# Patient Record
Sex: MALE | Race: WHITE | NOT HISPANIC OR LATINO | Employment: UNEMPLOYED | URBAN - METROPOLITAN AREA
[De-identification: names, ages, dates, MRNs, and addresses within clinical notes are randomized per-mention and may not be internally consistent; named-entity substitution may affect disease eponyms.]

---
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
## 2022-03-13 DIAGNOSIS — L70.9 ACNE, UNSPECIFIED ACNE TYPE: ICD-10-CM

## 2022-03-13 RX ORDER — METHOCARBAMOL 750 MG/1
TABLET ORAL
Qty: 90 G | Refills: 2 | Status: SHIPPED | OUTPATIENT
Start: 2022-03-13

## 2022-08-30 ENCOUNTER — OFFICE VISIT (OUTPATIENT)
Dept: FAMILY MEDICINE CLINIC | Facility: CLINIC | Age: 22
End: 2022-08-30
Payer: COMMERCIAL

## 2022-08-30 VITALS
RESPIRATION RATE: 16 BRPM | SYSTOLIC BLOOD PRESSURE: 120 MMHG | OXYGEN SATURATION: 97 % | HEART RATE: 63 BPM | TEMPERATURE: 98.4 F | BODY MASS INDEX: 21.62 KG/M2 | HEIGHT: 70 IN | WEIGHT: 151 LBS | DIASTOLIC BLOOD PRESSURE: 69 MMHG

## 2022-08-30 DIAGNOSIS — R17 ELEVATED BILIRUBIN: ICD-10-CM

## 2022-08-30 DIAGNOSIS — Z00.00 ENCOUNTER FOR WELLNESS EXAMINATION IN ADULT: Primary | ICD-10-CM

## 2022-08-30 DIAGNOSIS — L70.9 ACNE, UNSPECIFIED ACNE TYPE: ICD-10-CM

## 2022-08-30 PROBLEM — R10.13 EPIGASTRIC PAIN: Status: RESOLVED | Noted: 2021-10-10 | Resolved: 2022-08-30

## 2022-08-30 PROCEDURE — 99395 PREV VISIT EST AGE 18-39: CPT | Performed by: FAMILY MEDICINE

## 2022-08-30 RX ORDER — TRETINOIN 0.25 MG/G
GEL TOPICAL
Qty: 45 G | Refills: 3 | Status: SHIPPED | OUTPATIENT
Start: 2022-08-30

## 2022-08-30 NOTE — PROGRESS NOTES
FAMILY PRACTICE OFFICE VISIT       NAME: Jean Hope  AGE: 23 y o  SEX: male       : 2000        MRN: [id number]        Assessment and Plan     1  Encounter for wellness examination in adult    2  Acne, unspecified acne type  -     tretinoin (RETIN-A) 0 025 % gel; Apply topically daily at bedtime    3  Elevated bilirubin  Assessment & Plan:  Blood work performed a year ago revealed elevated bilirubin  Patient originally presented for evaluation of abdominal pain that subsequently has completely resolved  Normal CBC  Elevated bilirubin in the setting of normal LFTs could represent Gilbert's syndrome  He offers no complaints present time and prefers to hold of right upper quadrant ultrasound that I ordered back in 2021  Patient would like to repeat blood work  If bilirubin is still elevated he would be agreeable to pursue further workup with right upper quadrant ultrasound  Orders:  -     CBC and differential; Future  -     Comprehensive metabolic panel; Future  -     TSH, 3rd generation; Future            Depression Screening and Follow-up Plan: Patient was screened for depression during today's encounter  They screened negative with a PHQ-2 score of 0  There are no Patient Instructions on file for this visit  Return in about 1 year (around 2023) for Annual physical/well exam     Discussed with the patient and all questioned fully answered  He will call me if any problems arise  M*Modal software was used to dictate this note  It may contain errors with dictating incorrect words/spelling  Please contact provider directly with any questions  Chief Complaint     Chief Complaint   Patient presents with    Physical Exam       History of Present Illness       Well exam   Patient is here today accompanied by his mother and younger brother      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[employer], Biology Major   No daily Rx     No health  concerns   Regular diet , sleeps well, no abdominal pain or dyspepsia  Regular diet  Regular physical activity, works out at [employer]  Patient has been using Adapalene gel  OTC for acne - work somewhat , he is requesting stronger her Retin-A preparation for his acne  Patient had evaluation due to complain of abdominal pain a year ago  Blood work revealed elevated bilirubin of 3 1 with normal ALT, AST and alkaline phosphatase  Normal hemoglobin   Patient has not proceed with right upper quadrant ultrasound that I have recommended  He is completely asymptomatic from GI standpoint  Review of Systems   Review of Systems   Constitutional: Negative  HENT: Negative  Eyes: Negative  Respiratory: Negative  Cardiovascular: Negative  Gastrointestinal: Negative  Negative for abdominal pain and constipation  Endocrine: Negative  Genitourinary: Negative  Musculoskeletal: Negative  Skin: Negative for color change  acne   Allergic/Immunologic: Negative  Neurological: Negative  Hematological: Negative  Psychiatric/Behavioral: Negative  Active Problem List     Patient Active Problem List   Diagnosis    Elevated bilirubin       Past Medical History:  History reviewed  No pertinent past medical history  Past Surgical History:  History reviewed  No pertinent surgical history      Family History:  Family History   Problem Relation Age of Onset    Thyroid disease Mother     Hyperlipidemia Mother     Coronary artery disease Father     Hyperlipidemia Father     Anxiety disorder Father     AISHA disease Father        Social History:  Social History     Socioeconomic History    Marital status: Single     Spouse name: Not on file    Number of children: Not on file    Years of education: Not on file    Highest education level: Not on file   Occupational History    Not on file   Tobacco Use    Smoking status: Never Smoker    Smokeless tobacco: Never Used   Vaping Use    Vaping Use: Never used   Substance and Sexual Activity    Alcohol use: Not Currently    Drug use: Not Currently    Sexual activity: Not Currently   Other Topics Concern    Not on file   Social History Narrative    Not on file     Social Determinants of Health     Financial Resource Strain: Not on file   Food Insecurity: Not on file   Transportation Needs: Not on file   Physical Activity: Not on file   Stress: Not on file   Social Connections: Not on file   Intimate Partner Violence: Not on file   Housing Stability: Not on file         Objective     Vitals:    08/30/22 1635   BP: 120/69   BP Location: Right arm   Patient Position: Sitting   Cuff Size: Large   Pulse: 63   Resp: 16   Temp: 98 4 °F (36 9 °C)   TempSrc: Temporal   SpO2: 97%   Weight: 68 5 kg (151 lb)   Height: 5' 10" (1 778 m)       Wt Readings from Last 3 Encounters:   08/30/22 68 5 kg (151 lb)   10/04/21 67 3 kg (148 lb 6 4 oz) (39 %, Z= -0 27)*   03/10/20 64 5 kg (142 lb 3 2 oz) (38 %, Z= -0 29)*     * Growth percentiles are based on CDC (Boys, 2-20 Years) data  Physical Exam  Vitals and nursing note reviewed  Constitutional:       General: He is not in acute distress  Appearance: Normal appearance  He is well-developed  He is not ill-appearing  HENT:      Head: Normocephalic and atraumatic  Right Ear: Tympanic membrane and ear canal normal       Left Ear: Tympanic membrane and ear canal normal       Nose: Nose normal       Mouth/Throat:      Mouth: Mucous membranes are moist    Eyes:      General: No scleral icterus  Conjunctiva/sclera: Conjunctivae normal    Neck:      Vascular: No carotid bruit  Cardiovascular:      Rate and Rhythm: Normal rate and regular rhythm  Heart sounds: Normal heart sounds  No murmur heard  Pulmonary:      Effort: Pulmonary effort is normal  No respiratory distress  Breath sounds: Normal breath sounds  No wheezing or rales  Abdominal:      General: Bowel sounds are normal  There is no distension or abdominal bruit  Palpations: Abdomen is soft  There is no mass  Tenderness: There is no abdominal tenderness  Hernia: No hernia is present  Musculoskeletal:         General: Normal range of motion  Cervical back: Normal range of motion and neck supple  No rigidity  Right lower leg: No edema  Left lower leg: No edema  Skin:     General: Skin is warm  Findings: No rash  Comments: Acne face   Neurological:      General: No focal deficit present  Mental Status: He is alert and oriented to person, place, and time  Cranial Nerves: No cranial nerve deficit  Psychiatric:         Mood and Affect: Mood normal          Behavior: Behavior normal          Thought Content: Thought content normal           Pertinent Laboratory/Diagnostic Studies:    No results found for: WBC, HGB, HCT, MCV, PLT    No results found for: TSH    No results found for: CHOL  No results found for: TRIG  No results found for: HDL  No results found for: LDLCALC  No results found for: HGBA1C  No results found for: SODIUM, K, CL, CO2, ANIONGAP, AGAP, BUN, CREATININE, GLUC, GLUF, CALCIUM, AST, ALT, ALKPHOS, PROT, TP, BILITOT, TBILI, EGFR    Orders Placed This Encounter   Procedures    CBC and differential    Comprehensive metabolic panel    TSH, 3rd generation       ALLERGIES:  No Known Allergies    Current Medications     Current Outpatient Medications   Medication Sig Dispense Refill    Protein POWD Take 1 Scoop by mouth in the morning POST WORK OUT      tretinoin (RETIN-A) 0 025 % gel Apply topically daily at bedtime 45 g 3    Acne Medication 5 5 % gel APPLY TO AFFECTED AREA EVERY DAY (Patient not taking: Reported on 8/30/2022) 90 g 2    clindamycin (CLINDAGEL) 1 % gel APPLY TO AFFECTED AREA TWICE A DAY (Patient not taking: Reported on 8/30/2022) 60 g 1     No current facility-administered medications for this visit         Medications Discontinued During This Encounter   Medication Reason    pantoprazole (PROTONIX) 40 mg tablet Therapy completed       Health Maintenance     Health Maintenance   Topic Date Due    Hepatitis C Screening  Never done    HIV Screening  Never done    Influenza Vaccine (1) 09/01/2022    COVID-19 Vaccine (1) 11/30/2022 (Originally 2002)    Depression Screening  08/30/2023    BMI: Adult  08/30/2023    Annual Physical  08/30/2023    DTaP,Tdap,and Td Vaccines (7 - Td or Tdap) 09/18/2023    Pneumococcal Vaccine: Pediatrics (0 to 5 Years) and At-Risk Patients (6 to 59 Years)  Completed    HIB Vaccine  Completed    Hepatitis B Vaccine  Completed    IPV Vaccine  Completed    Hepatitis A Vaccine  Completed    Meningococcal ACWY Vaccine  Completed    HPV Vaccine  Completed       Immunization History   Administered Date(s) Administered    DTaP 2002, 2002, 2002, 05/12/2003, 10/01/2007    HPV 11/18/2013    HPV Quadrivalent 09/18/2013, 11/18/2013, 02/21/2014, 10/20/2017    Hep A, ped/adol, 2 dose 10/01/2007, 10/31/2008    Hep B, Adolescent or Pediatric 2002, 2002, 05/12/2003    Hepatitis A 10/01/2007, 10/31/2008    HiB 2002, 2002, 05/12/2003    INFLUENZA 10/31/2008    IPV 2002, 2002, 05/12/2003, 10/01/2007    MMR 02/10/2003, 10/01/2007    MMRV 10/01/2007    Meningococcal B, Recombinant (TRUMENBA) 03/10/2020    Meningococcal Conjugate (MCV4O) 09/18/2013    Meningococcal MCV4P 09/18/2013, 10/11/2018, 09/10/2020    Pneumococcal Conjugate 13-Valent 2002, 2002, 05/12/2003    Pneumococcal Conjugate PCV 7 2002, 2002, 05/12/2003    Tdap 09/18/2013    Tuberculin Skin Test-PPD Intradermal 10/01/2007    Varicella 02/10/2003, 10/01/2007       Lupe Andres MD

## 2022-09-04 NOTE — ASSESSMENT & PLAN NOTE
Blood work performed a year ago revealed elevated bilirubin  Patient originally presented for evaluation of abdominal pain that subsequently has completely resolved  Normal CBC  Elevated bilirubin in the setting of normal LFTs could represent Gilbert's syndrome  He offers no complaints present time and prefers to hold of right upper quadrant ultrasound that I ordered back in October of 2021  Patient would like to repeat blood work  If bilirubin is still elevated he would be agreeable to pursue further workup with right upper quadrant ultrasound

## 2022-09-08 ENCOUNTER — APPOINTMENT (OUTPATIENT)
Dept: LAB | Facility: CLINIC | Age: 22
End: 2022-09-08
Payer: COMMERCIAL

## 2022-09-08 DIAGNOSIS — R10.13 EPIGASTRIC PAIN: ICD-10-CM

## 2022-09-08 DIAGNOSIS — R17 ELEVATED BILIRUBIN: ICD-10-CM

## 2022-09-08 DIAGNOSIS — R71.8 ELEVATED HEMATOCRIT: Primary | ICD-10-CM

## 2022-09-08 LAB
ALBUMIN SERPL BCP-MCNC: 4.2 G/DL (ref 3.5–5)
ALP SERPL-CCNC: 86 U/L (ref 46–116)
ALT SERPL W P-5'-P-CCNC: 31 U/L (ref 12–78)
ANION GAP SERPL CALCULATED.3IONS-SCNC: 7 MMOL/L (ref 4–13)
AST SERPL W P-5'-P-CCNC: 22 U/L (ref 5–45)
BASOPHILS # BLD AUTO: 0.02 THOUSANDS/ΜL (ref 0–0.1)
BASOPHILS NFR BLD AUTO: 0 % (ref 0–1)
BILIRUB DIRECT SERPL-MCNC: 0.33 MG/DL (ref 0–0.2)
BILIRUB SERPL-MCNC: 2.17 MG/DL (ref 0.2–1)
BUN SERPL-MCNC: 10 MG/DL (ref 5–25)
CALCIUM SERPL-MCNC: 9.4 MG/DL (ref 8.3–10.1)
CHLORIDE SERPL-SCNC: 104 MMOL/L (ref 96–108)
CO2 SERPL-SCNC: 28 MMOL/L (ref 21–32)
CREAT SERPL-MCNC: 0.96 MG/DL (ref 0.6–1.3)
EOSINOPHIL # BLD AUTO: 0.05 THOUSAND/ΜL (ref 0–0.61)
EOSINOPHIL NFR BLD AUTO: 1 % (ref 0–6)
ERYTHROCYTE [DISTWIDTH] IN BLOOD BY AUTOMATED COUNT: 12.1 % (ref 11.6–15.1)
GFR SERPL CREATININE-BSD FRML MDRD: 113 ML/MIN/1.73SQ M
GLUCOSE P FAST SERPL-MCNC: 83 MG/DL (ref 65–99)
HCT VFR BLD AUTO: 52.1 % (ref 36.5–49.3)
HGB BLD-MCNC: 17.4 G/DL (ref 12–17)
IMM GRANULOCYTES # BLD AUTO: 0.02 THOUSAND/UL (ref 0–0.2)
IMM GRANULOCYTES NFR BLD AUTO: 0 % (ref 0–2)
LYMPHOCYTES # BLD AUTO: 1.38 THOUSANDS/ΜL (ref 0.6–4.47)
LYMPHOCYTES NFR BLD AUTO: 26 % (ref 14–44)
MCH RBC QN AUTO: 31.2 PG (ref 26.8–34.3)
MCHC RBC AUTO-ENTMCNC: 33.4 G/DL (ref 31.4–37.4)
MCV RBC AUTO: 93 FL (ref 82–98)
MONOCYTES # BLD AUTO: 0.39 THOUSAND/ΜL (ref 0.17–1.22)
MONOCYTES NFR BLD AUTO: 7 % (ref 4–12)
NEUTROPHILS # BLD AUTO: 3.42 THOUSANDS/ΜL (ref 1.85–7.62)
NEUTS SEG NFR BLD AUTO: 66 % (ref 43–75)
NRBC BLD AUTO-RTO: 0 /100 WBCS
PLATELET # BLD AUTO: 193 THOUSANDS/UL (ref 149–390)
PMV BLD AUTO: 9.6 FL (ref 8.9–12.7)
POTASSIUM SERPL-SCNC: 3.8 MMOL/L (ref 3.5–5.3)
PROT SERPL-MCNC: 8.1 G/DL (ref 6.4–8.4)
RBC # BLD AUTO: 5.58 MILLION/UL (ref 3.88–5.62)
SODIUM SERPL-SCNC: 139 MMOL/L (ref 135–147)
TSH SERPL DL<=0.05 MIU/L-ACNC: 0.99 UIU/ML (ref 0.45–4.5)
WBC # BLD AUTO: 5.28 THOUSAND/UL (ref 4.31–10.16)

## 2022-09-08 PROCEDURE — 82248 BILIRUBIN DIRECT: CPT

## 2022-09-08 PROCEDURE — 84443 ASSAY THYROID STIM HORMONE: CPT

## 2022-09-08 PROCEDURE — 85025 COMPLETE CBC W/AUTO DIFF WBC: CPT

## 2022-09-08 PROCEDURE — 80053 COMPREHEN METABOLIC PANEL: CPT

## 2022-09-08 PROCEDURE — 36415 COLL VENOUS BLD VENIPUNCTURE: CPT

## 2022-09-12 ENCOUNTER — HOSPITAL ENCOUNTER (OUTPATIENT)
Dept: RADIOLOGY | Facility: HOSPITAL | Age: 22
Discharge: HOME | End: 2022-09-12
Payer: COMMERCIAL

## 2022-09-12 DIAGNOSIS — R10.13 EPIGASTRIC PAIN: ICD-10-CM

## 2022-09-12 DIAGNOSIS — R17 ELEVATED BILIRUBIN: ICD-10-CM

## 2022-09-12 PROCEDURE — 76705 ECHO EXAM OF ABDOMEN: CPT

## 2023-11-29 DIAGNOSIS — R17 ELEVATED BILIRUBIN: Primary | ICD-10-CM

## 2024-01-12 ENCOUNTER — APPOINTMENT (OUTPATIENT)
Dept: LAB | Facility: CLINIC | Age: 24
End: 2024-01-12
Payer: COMMERCIAL

## 2024-01-12 ENCOUNTER — OFFICE VISIT (OUTPATIENT)
Dept: FAMILY MEDICINE CLINIC | Facility: CLINIC | Age: 24
End: 2024-01-12
Payer: COMMERCIAL

## 2024-01-12 VITALS
HEART RATE: 61 BPM | RESPIRATION RATE: 16 BRPM | TEMPERATURE: 98.4 F | WEIGHT: 142 LBS | BODY MASS INDEX: 20.33 KG/M2 | OXYGEN SATURATION: 100 % | HEIGHT: 70 IN | DIASTOLIC BLOOD PRESSURE: 88 MMHG | SYSTOLIC BLOOD PRESSURE: 130 MMHG

## 2024-01-12 DIAGNOSIS — L70.0 ACNE VULGARIS: ICD-10-CM

## 2024-01-12 DIAGNOSIS — K21.9 CHRONIC GERD: ICD-10-CM

## 2024-01-12 DIAGNOSIS — E80.4 GILBERT SYNDROME: ICD-10-CM

## 2024-01-12 DIAGNOSIS — R17 ELEVATED BILIRUBIN: ICD-10-CM

## 2024-01-12 DIAGNOSIS — Z00.00 WELL ADULT EXAM: Primary | ICD-10-CM

## 2024-01-12 LAB
ALBUMIN SERPL BCP-MCNC: 4.8 G/DL (ref 3.5–5)
ALP SERPL-CCNC: 70 U/L (ref 34–104)
ALT SERPL W P-5'-P-CCNC: 15 U/L (ref 7–52)
ANION GAP SERPL CALCULATED.3IONS-SCNC: 7 MMOL/L
AST SERPL W P-5'-P-CCNC: 16 U/L (ref 13–39)
BASOPHILS # BLD AUTO: 0.02 THOUSANDS/ÂΜL (ref 0–0.1)
BASOPHILS NFR BLD AUTO: 0 % (ref 0–1)
BILIRUB SERPL-MCNC: 1.58 MG/DL (ref 0.2–1)
BUN SERPL-MCNC: 11 MG/DL (ref 5–25)
CALCIUM SERPL-MCNC: 10.2 MG/DL (ref 8.4–10.2)
CHLORIDE SERPL-SCNC: 101 MMOL/L (ref 96–108)
CO2 SERPL-SCNC: 31 MMOL/L (ref 21–32)
CREAT SERPL-MCNC: 0.81 MG/DL (ref 0.6–1.3)
EOSINOPHIL # BLD AUTO: 0.09 THOUSAND/ÂΜL (ref 0–0.61)
EOSINOPHIL NFR BLD AUTO: 2 % (ref 0–6)
ERYTHROCYTE [DISTWIDTH] IN BLOOD BY AUTOMATED COUNT: 12.3 % (ref 11.6–15.1)
GFR SERPL CREATININE-BSD FRML MDRD: 126 ML/MIN/1.73SQ M
GLUCOSE P FAST SERPL-MCNC: 81 MG/DL (ref 65–99)
HCT VFR BLD AUTO: 50.5 % (ref 36.5–49.3)
HGB BLD-MCNC: 16.9 G/DL (ref 12–17)
IMM GRANULOCYTES # BLD AUTO: 0.02 THOUSAND/UL (ref 0–0.2)
IMM GRANULOCYTES NFR BLD AUTO: 0 % (ref 0–2)
LYMPHOCYTES # BLD AUTO: 1.1 THOUSANDS/ÂΜL (ref 0.6–4.47)
LYMPHOCYTES NFR BLD AUTO: 20 % (ref 14–44)
MCH RBC QN AUTO: 31.1 PG (ref 26.8–34.3)
MCHC RBC AUTO-ENTMCNC: 33.5 G/DL (ref 31.4–37.4)
MCV RBC AUTO: 93 FL (ref 82–98)
MONOCYTES # BLD AUTO: 0.47 THOUSAND/ÂΜL (ref 0.17–1.22)
MONOCYTES NFR BLD AUTO: 9 % (ref 4–12)
NEUTROPHILS # BLD AUTO: 3.82 THOUSANDS/ÂΜL (ref 1.85–7.62)
NEUTS SEG NFR BLD AUTO: 69 % (ref 43–75)
NRBC BLD AUTO-RTO: 0 /100 WBCS
PLATELET # BLD AUTO: 212 THOUSANDS/UL (ref 149–390)
PMV BLD AUTO: 10 FL (ref 8.9–12.7)
POTASSIUM SERPL-SCNC: 4.1 MMOL/L (ref 3.5–5.3)
PROT SERPL-MCNC: 8 G/DL (ref 6.4–8.4)
RBC # BLD AUTO: 5.44 MILLION/UL (ref 3.88–5.62)
SODIUM SERPL-SCNC: 139 MMOL/L (ref 135–147)
WBC # BLD AUTO: 5.52 THOUSAND/UL (ref 4.31–10.16)

## 2024-01-12 PROCEDURE — 99395 PREV VISIT EST AGE 18-39: CPT | Performed by: FAMILY MEDICINE

## 2024-01-12 PROCEDURE — 85025 COMPLETE CBC W/AUTO DIFF WBC: CPT

## 2024-01-12 PROCEDURE — 80053 COMPREHEN METABOLIC PANEL: CPT

## 2024-01-12 PROCEDURE — 36415 COLL VENOUS BLD VENIPUNCTURE: CPT

## 2024-01-12 RX ORDER — DOXYCYCLINE HYCLATE 100 MG/1
CAPSULE ORAL
Qty: 90 CAPSULE | Refills: 0 | Status: SHIPPED | OUTPATIENT
Start: 2024-01-12 | End: 2024-03-12

## 2024-01-12 RX ORDER — DOXYCYCLINE HYCLATE 20 MG
TABLET ORAL
Qty: 180 TABLET | Refills: 3 | Status: SHIPPED | OUTPATIENT
Start: 2024-01-12 | End: 2025-01-12

## 2024-01-12 NOTE — PROGRESS NOTES
Name: Jean Hope      : 2000      MRN: [id number]  Encounter Provider: Angeles Pretty MD  Encounter Date: 2024   Encounter department: [facility]    Assessment & Plan     1. Well adult exam    2. Acne vulgaris  -     benzoyl peroxide 5 % external liquid; Apply topically 2 (two) times a day  -     doxycycline hyclate (VIBRAMYCIN) 100 mg capsule; Take 1 capsule twice a day for 30 days then decrease dose to 1 capsule daily for 30 days  -     doxycycline (PERIOSTAT) 20 MG tablet; Take 2 tablets daily  -     Ambulatory Referral to Dermatology; Future    3. Chronic GERD  -     Ambulatory referral to Gastroenterology; Future    4. Gilbert syndrome  Assessment & Plan:  Chronic elevation of bilirubin, normal LFTs, unremarkable right upper quadrant ultrasound        Patient Instructions   Please start doxycycline: 100 mg twice a day for 30 days, then 100 mg daily for 30 days then 40 mg daily as a maintenance dose  Please use topical benzyl peroxide  Please schedule follow-up with dermatology           Subjective     Annual well exam.  Senior in college.  Patient is here today accompanied by his mother.  He has been feeling generally well.  Regular diet, exercises.  Uses protein shakes/supplements.  Reports intermittent symptoms of acid reflux.  Concerned about possible gluten sensitivity.    Inflammatory acne.  Previous trial of topical Rx was unsuccessful.  Patient is not under care of dermatology          Review of Systems   Constitutional: Negative.    Eyes: Negative.    Respiratory: Negative.     Cardiovascular: Negative.    Gastrointestinal: Negative.         Acid reflux symptoms   Endocrine: Negative.    Musculoskeletal: Negative.    Skin:         As per HPI   Allergic/Immunologic: Negative.    Neurological: Negative.    Psychiatric/Behavioral: Negative.         History reviewed. No pertinent past medical history.  History reviewed. No pertinent surgical history.  Family  History   Problem Relation Age of Onset   • Thyroid disease Mother    • Hyperlipidemia Mother    • Coronary artery disease Father    • Hyperlipidemia Father    • Anxiety disorder Father    • AISHA disease Father      Social History     Socioeconomic History   • Marital status: Single     Spouse name: None   • Number of children: None   • Years of education: None   • Highest education level: None   Occupational History   • None   Tobacco Use   • Smoking status: Never   • Smokeless tobacco: Never   Vaping Use   • Vaping status: Never Used   Substance and Sexual Activity   • Alcohol use: Not Currently   • Drug use: Not Currently   • Sexual activity: Not Currently   Other Topics Concern   • None   Social History Narrative   • None     Social Determinants of Health     Financial Resource Strain: Not on file   Food Insecurity: Not on file   Transportation Needs: Not on file   Physical Activity: Not on file   Stress: Not on file   Social Connections: Not on file   Intimate Partner Violence: Not on file   Housing Stability: Not on file     Current Outpatient Medications on File Prior to Visit   Medication Sig   • Protein POWD Take 1 Scoop by mouth in the morning POST WORK OUT   • tretinoin (RETIN-A) 0.025 % gel Apply topically daily at bedtime   • clindamycin (CLINDAGEL) 1 % gel APPLY TO AFFECTED AREA TWICE A DAY (Patient not taking: Reported on 8/30/2022)     No Known Allergies  Immunization History   Administered Date(s) Administered   • DTaP 2000, 2000, 2002, 05/12/2003, 10/01/2007   • HPV 11/18/2013   • HPV Quadrivalent 09/18/2013, 11/18/2013, 02/21/2014, 10/20/2017   • Hep A, ped/adol, 2 dose 10/01/2007, 10/31/2008   • Hep B, Adolescent or Pediatric 2002, 2002, 05/12/2003   • Hepatitis A 10/01/2007, 10/31/2008   • HiB 2002, 2002, 05/12/2003   • INFLUENZA 10/31/2008   • IPV 2002, 2002, 05/12/2003, 10/01/2007   • MMR 02/10/2003, 10/01/2007   • MMRV 10/01/2007   •  "Meningococcal B, Recombinant (TRUMENBA) 03/10/2020   • Meningococcal Conjugate (MCV4O) 09/18/2013   • Meningococcal MCV4P 09/18/2013, 10/11/2018, 09/10/2020   • Pneumococcal Conjugate 13-Valent 2002, 2002, 05/12/2003   • Pneumococcal Conjugate PCV 7 2002, 2002, 05/12/2003   • Tdap 09/18/2013   • Tuberculin Skin Test-PPD Intradermal 10/01/2007   • Varicella 02/10/2003, 10/01/2007       Objective     /88 (BP Location: Left arm, Patient Position: Sitting, Cuff Size: Standard)   Pulse 61   Temp 98.4 °F (36.9 °C) (Temporal)   Resp 16   Ht 5' 10\" (1.778 m)   Wt 64.4 kg (142 lb)   SpO2 100%   BMI 20.37 kg/m²     Physical Exam  Vitals and nursing note reviewed.   Constitutional:       General: He is not in acute distress.     Appearance: Normal appearance. He is well-developed. He is not ill-appearing.   HENT:      Head: Normocephalic and atraumatic.   Eyes:      Conjunctiva/sclera: Conjunctivae normal.   Neck:      Vascular: No carotid bruit.   Cardiovascular:      Rate and Rhythm: Normal rate and regular rhythm.      Heart sounds: Normal heart sounds. No murmur heard.  Pulmonary:      Effort: Pulmonary effort is normal. No respiratory distress.      Breath sounds: Normal breath sounds. No wheezing or rales.   Abdominal:      General: Bowel sounds are normal. There is no distension or abdominal bruit.      Palpations: Abdomen is soft.      Tenderness: There is no abdominal tenderness.   Musculoskeletal:         General: Normal range of motion.      Cervical back: Neck supple. No rigidity.      Right lower leg: No edema.      Left lower leg: No edema.   Skin:     Comments: Inflammatory acne face   Neurological:      General: No focal deficit present.      Mental Status: He is alert and oriented to person, place, and time.      Cranial Nerves: No cranial nerve deficit.   Psychiatric:         Mood and Affect: Mood normal.         Behavior: Behavior normal.       Angeles Pretty MD    "

## 2024-01-12 NOTE — PATIENT INSTRUCTIONS
Please start doxycycline: 100 mg twice a day for 30 days, then 100 mg daily for 30 days then 40 mg daily as a maintenance dose  Please use topical benzyl peroxide  Please schedule follow-up with dermatology

## 2024-01-14 PROBLEM — E80.4 GILBERT SYNDROME: Status: ACTIVE | Noted: 2021-10-10

## 2024-01-24 ENCOUNTER — OFFICE VISIT (OUTPATIENT)
Dept: GASTROENTEROLOGY | Facility: AMBULARY SURGERY CENTER | Age: 24
End: 2024-01-24
Payer: COMMERCIAL

## 2024-01-24 VITALS
DIASTOLIC BLOOD PRESSURE: 76 MMHG | BODY MASS INDEX: 20.64 KG/M2 | WEIGHT: 144.2 LBS | OXYGEN SATURATION: 98 % | HEART RATE: 65 BPM | SYSTOLIC BLOOD PRESSURE: 116 MMHG | HEIGHT: 70 IN

## 2024-01-24 DIAGNOSIS — K21.9 GASTROESOPHAGEAL REFLUX DISEASE, UNSPECIFIED WHETHER ESOPHAGITIS PRESENT: Primary | ICD-10-CM

## 2024-01-24 DIAGNOSIS — R10.13 EPIGASTRIC PAIN: ICD-10-CM

## 2024-01-24 DIAGNOSIS — T36.4X5A ADVERSE EFFECT OF DOXYCYCLINE, INITIAL ENCOUNTER: ICD-10-CM

## 2024-01-24 PROCEDURE — 99244 OFF/OP CNSLTJ NEW/EST MOD 40: CPT | Performed by: INTERNAL MEDICINE

## 2024-01-24 RX ORDER — OMEPRAZOLE 40 MG/1
40 CAPSULE, DELAYED RELEASE ORAL DAILY
Qty: 90 CAPSULE | Refills: 3 | Status: SHIPPED | OUTPATIENT
Start: 2024-01-24

## 2024-01-24 NOTE — PROGRESS NOTES
[facility] - Outpatient Consultation  Jean Hope 24 y.o. male MRN: [id number]  Encounter: [id number]      PCP: Angeles Pretty MD  Referring: Angeles Pretty MD  [address] [place] [id number]      ASSESSMENT AND PLAN:    1. Gastroesophageal reflux disease, unspecified whether esophagitis present  -     Ambulatory referral to Gastroenterology  -     EGD; Future; Expected date: 01/24/2024  -      right upper quadrant; Future; Expected date: 01/24/2024  -     omeprazole (PriLOSEC) 40 MG capsule; Take 1 capsule (40 mg total) by mouth daily    2. Epigastric pain  -     EGD; Future; Expected date: 01/24/2024  -      right upper quadrant; Future; Expected date: 01/24/2024  -     omeprazole (PriLOSEC) 40 MG capsule; Take 1 capsule (40 mg total) by mouth daily    3. Adverse effect of doxycycline, initial encounter  -     EGD; Future; Expected date: 01/24/2024       - chronic symptoms on going > 12 months, with intermittent nature and recent worsening prompting evaluation  - RUQ ultrasound to evaluate for biliary pathology  - EGD to assess for erosive disease, esophageal candidiasis, H pylori and to address his concerns.   - we discussed risk factors for erosive GI disease, including doxycycline, I recommended him to take the doxycycline with food.   Discussed anti-reflux measures, and offered handout detailing, including weight loss, avoidance of lying down after meals, recognize/avoid trigger foods, and elevating the head of bed.   - recommend prilosc 40 mg AC Breakfast daily  - avoid NSAIDs  ______________________________________________________________________    CC:  Chief Complaint   Patient presents with    Heartburn       HPI:     Patient is a 24-year-old male referred for GERD.  He has Gilbert's syndrome, acne vulgaris.       The patient reports nocturnal dysphagia, with saliva regurgitating in his throat during sleep. He also experiences excessive belching  postprandial. He is concerned about potential Helicobacter pylori infection, bacterial overgrowth, or possible gastric epithelial defects leading to gas escape. These symptoms have been present for several months, possibly less than a year. He perceives a slight improvement in his symptoms. He speculates that his gastric pH may be abnormal, either hypo acidic or hyper acidic. The patient reports dysphagia, particularly when supine or during nocturnal hours. He experiences pyrosis in the upper abdomen upon waking in the morning. He denies experiencing nausea, emesis, or weight loss. His bowel movements are regular, and he denies hematochezia. The patient has not utilized any over-the-counter medications, including NSAIDs, for symptom management. He denies any history of abdominal or pelvic surgeries. His symptoms first presented approximately 1.5 years ago, concurrent with a hepatic ultrasound. Symptoms occur intermittently throughout the week but do not impact his quality of life. The patient inquires about the availability of a way to assess current pH acid levels.     He started doxycycline 1 week ago for acne and was ordered a 3-month course for this medication. His symptoms have stayed the same since starting the doxycycline.    He is a graduating student majoring in biology. He denies a history of tobacco use, cigarette smoking, alcohol, or marijuana use. He denies a family history of esophageal cancer, colon cancer, ulcerative colitis, Crohn's disease, or celiac disease.      Abdominal Pain  This is a new problem. The current episode started more than 1 month ago. The onset quality is gradual. The problem occurs intermittently. The most recent episode lasted 2 hours. The problem has been gradually improving since onset. The pain is located in the generalized abdominal region. The pain is at a severity of 4/10. The quality of the pain is described as burning. Associated symptoms include belching. Pertinent  "negatives include no anorexia, arthralgias, constipation, diarrhea, dysuria, fever, flatus, frequency, headaches, hematochezia, hematuria, melena, myalgias, nausea or vomiting. The symptoms are relieved by liquids and standing.           Historical Information   History reviewed. No pertinent past medical history.  History reviewed. No pertinent surgical history.  Social History   Social History     Substance and Sexual Activity   Alcohol Use Not Currently     Social History     Substance and Sexual Activity   Drug Use Not Currently     Social History     Tobacco Use   Smoking Status Never   Smokeless Tobacco Never     Family History   Problem Relation Age of Onset    Thyroid disease Mother     Hyperlipidemia Mother     Coronary artery disease Father     Hyperlipidemia Father     Anxiety disorder Father     AISHA disease Father        Meds/Allergies       Current Outpatient Medications:     doxycycline hyclate (VIBRAMYCIN) 100 mg capsule    omeprazole (PriLOSEC) 40 MG capsule    benzoyl peroxide 5 % external liquid    clindamycin (CLINDAGEL) 1 % gel    doxycycline (PERIOSTAT) 20 MG tablet    Protein POWD    tretinoin (RETIN-A) 0.025 % gel    No Known Allergies        Objective     Blood pressure 116/76, pulse 65, height 5' 10\" (1.778 m), weight 65.4 kg (144 lb 3.2 oz), SpO2 98%. Body mass index is 20.69 kg/m².     PHYSICAL EXAM:    General Appearance:   Alert, cooperative, no distress   HEENT:   Normocephalic, atraumatic, anicteric.     Neck:  Supple, symmetrical, trachea midline   Lungs:   Clear to auscultation bilaterally; no rales, rhonchi or wheezing; respirations unlabored    Heart::   Regular rate and rhythm; no murmur, rub, or gallop.   Abdomen:   Soft, non-tender, non-distended; normal bowel sounds; no masses, no organomegaly    Genitalia:   Deferred    Rectal:   Deferred    Extremities:  No cyanosis, clubbing or edema    Pulses:  2+ and symmetric    Skin:  No jaundice, rashes, or lesions    Lymph nodes:  No " "palpable cervical lymphadenopathy            Lab Results:     Lab Results   Component Value Date    WBC 5.52 01/12/2024    HGB 16.9 01/12/2024    HCT 50.5 (H) 01/12/2024    MCV 93 01/12/2024     01/12/2024       Lab Results   Component Value Date    K 4.1 01/12/2024     01/12/2024    CO2 31 01/12/2024    BUN 11 01/12/2024    CREATININE 0.81 01/12/2024    GLUF 81 01/12/2024    CALCIUM 10.2 01/12/2024    AST 16 01/12/2024    ALT 15 01/12/2024    ALKPHOS 70 01/12/2024    EGFR 126 01/12/2024       No results found for: \"INR\", \"PROTIME\"    I personally reviewed relevant lab results including normal liver enzymes.    Radiology Results:   RUQ ultrasound Sept 2022- normal    Images reviewed on PACS by me.    I have personally reviewed results with the patient.    Transcribed for Magdalene Giron MD, by Immanuel Bergeron on 1/22/2024. Powered by Dragon [employer].       "

## 2024-01-24 NOTE — PATIENT INSTRUCTIONS
Scheduled date of EGD(as of today):02/01/24  Physician performing EGD: Dr. Giron  Location of EGD: [facility]  Instructions reviewed with patient by: marsha  Clearances: ana

## 2024-01-24 NOTE — H&P (VIEW-ONLY)
[facility] - Outpatient Consultation  Jean Hope 24 y.o. male MRN: [id number]  Encounter: [id number]      PCP: Angeles Pretty MD  Referring: Angeles Pretty MD  [address] [place] [id number]      ASSESSMENT AND PLAN:    1. Gastroesophageal reflux disease, unspecified whether esophagitis present  -     Ambulatory referral to Gastroenterology  -     EGD; Future; Expected date: 01/24/2024  -      right upper quadrant; Future; Expected date: 01/24/2024  -     omeprazole (PriLOSEC) 40 MG capsule; Take 1 capsule (40 mg total) by mouth daily    2. Epigastric pain  -     EGD; Future; Expected date: 01/24/2024  -      right upper quadrant; Future; Expected date: 01/24/2024  -     omeprazole (PriLOSEC) 40 MG capsule; Take 1 capsule (40 mg total) by mouth daily    3. Adverse effect of doxycycline, initial encounter  -     EGD; Future; Expected date: 01/24/2024       - chronic symptoms on going > 12 months, with intermittent nature and recent worsening prompting evaluation  - RUQ ultrasound to evaluate for biliary pathology  - EGD to assess for erosive disease, esophageal candidiasis, H pylori and to address his concerns.   - we discussed risk factors for erosive GI disease, including doxycycline, I recommended him to take the doxycycline with food.   Discussed anti-reflux measures, and offered handout detailing, including weight loss, avoidance of lying down after meals, recognize/avoid trigger foods, and elevating the head of bed.   - recommend prilosc 40 mg AC Breakfast daily  - avoid NSAIDs  ______________________________________________________________________    CC:  Chief Complaint   Patient presents with    Heartburn       HPI:     Patient is a 24-year-old male referred for GERD.  He has Gilbert's syndrome, acne vulgaris.       The patient reports nocturnal dysphagia, with saliva regurgitating in his throat during sleep. He also experiences excessive belching  postprandial. He is concerned about potential Helicobacter pylori infection, bacterial overgrowth, or possible gastric epithelial defects leading to gas escape. These symptoms have been present for several months, possibly less than a year. He perceives a slight improvement in his symptoms. He speculates that his gastric pH may be abnormal, either hypo acidic or hyper acidic. The patient reports dysphagia, particularly when supine or during nocturnal hours. He experiences pyrosis in the upper abdomen upon waking in the morning. He denies experiencing nausea, emesis, or weight loss. His bowel movements are regular, and he denies hematochezia. The patient has not utilized any over-the-counter medications, including NSAIDs, for symptom management. He denies any history of abdominal or pelvic surgeries. His symptoms first presented approximately 1.5 years ago, concurrent with a hepatic ultrasound. Symptoms occur intermittently throughout the week but do not impact his quality of life. The patient inquires about the availability of a way to assess current pH acid levels.     He started doxycycline 1 week ago for acne and was ordered a 3-month course for this medication. His symptoms have stayed the same since starting the doxycycline.    He is a graduating student majoring in biology. He denies a history of tobacco use, cigarette smoking, alcohol, or marijuana use. He denies a family history of esophageal cancer, colon cancer, ulcerative colitis, Crohn's disease, or celiac disease.      Abdominal Pain  This is a new problem. The current episode started more than 1 month ago. The onset quality is gradual. The problem occurs intermittently. The most recent episode lasted 2 hours. The problem has been gradually improving since onset. The pain is located in the generalized abdominal region. The pain is at a severity of 4/10. The quality of the pain is described as burning. Associated symptoms include belching. Pertinent  "negatives include no anorexia, arthralgias, constipation, diarrhea, dysuria, fever, flatus, frequency, headaches, hematochezia, hematuria, melena, myalgias, nausea or vomiting. The symptoms are relieved by liquids and standing.           Historical Information   History reviewed. No pertinent past medical history.  History reviewed. No pertinent surgical history.  Social History   Social History     Substance and Sexual Activity   Alcohol Use Not Currently     Social History     Substance and Sexual Activity   Drug Use Not Currently     Social History     Tobacco Use   Smoking Status Never   Smokeless Tobacco Never     Family History   Problem Relation Age of Onset    Thyroid disease Mother     Hyperlipidemia Mother     Coronary artery disease Father     Hyperlipidemia Father     Anxiety disorder Father     AISHA disease Father        Meds/Allergies       Current Outpatient Medications:     doxycycline hyclate (VIBRAMYCIN) 100 mg capsule    omeprazole (PriLOSEC) 40 MG capsule    benzoyl peroxide 5 % external liquid    clindamycin (CLINDAGEL) 1 % gel    doxycycline (PERIOSTAT) 20 MG tablet    Protein POWD    tretinoin (RETIN-A) 0.025 % gel    No Known Allergies        Objective     Blood pressure 116/76, pulse 65, height 5' 10\" (1.778 m), weight 65.4 kg (144 lb 3.2 oz), SpO2 98%. Body mass index is 20.69 kg/m².     PHYSICAL EXAM:    General Appearance:   Alert, cooperative, no distress   HEENT:   Normocephalic, atraumatic, anicteric.     Neck:  Supple, symmetrical, trachea midline   Lungs:   Clear to auscultation bilaterally; no rales, rhonchi or wheezing; respirations unlabored    Heart::   Regular rate and rhythm; no murmur, rub, or gallop.   Abdomen:   Soft, non-tender, non-distended; normal bowel sounds; no masses, no organomegaly    Genitalia:   Deferred    Rectal:   Deferred    Extremities:  No cyanosis, clubbing or edema    Pulses:  2+ and symmetric    Skin:  No jaundice, rashes, or lesions    Lymph nodes:  No " "palpable cervical lymphadenopathy            Lab Results:     Lab Results   Component Value Date    WBC 5.52 01/12/2024    HGB 16.9 01/12/2024    HCT 50.5 (H) 01/12/2024    MCV 93 01/12/2024     01/12/2024       Lab Results   Component Value Date    K 4.1 01/12/2024     01/12/2024    CO2 31 01/12/2024    BUN 11 01/12/2024    CREATININE 0.81 01/12/2024    GLUF 81 01/12/2024    CALCIUM 10.2 01/12/2024    AST 16 01/12/2024    ALT 15 01/12/2024    ALKPHOS 70 01/12/2024    EGFR 126 01/12/2024       No results found for: \"INR\", \"PROTIME\"    I personally reviewed relevant lab results including normal liver enzymes.    Radiology Results:   RUQ ultrasound Sept 2022- normal    Images reviewed on PACS by me.    I have personally reviewed results with the patient.    Transcribed for Magdalene Giron MD, by Immanuel Bergeron on 1/22/2024. Powered by Dragon [employer].       "

## 2024-01-29 ENCOUNTER — TELEPHONE (OUTPATIENT)
Dept: GASTROENTEROLOGY | Facility: CLINIC | Age: 24
End: 2024-01-29

## 2024-01-30 ENCOUNTER — ANESTHESIA (OUTPATIENT)
Dept: ANESTHESIOLOGY | Facility: AMBULATORY SURGERY CENTER | Age: 24
End: 2024-01-30

## 2024-01-30 ENCOUNTER — ANESTHESIA EVENT (OUTPATIENT)
Dept: ANESTHESIOLOGY | Facility: AMBULATORY SURGERY CENTER | Age: 24
End: 2024-01-30

## 2024-01-31 RX ORDER — SODIUM CHLORIDE, SODIUM LACTATE, POTASSIUM CHLORIDE, CALCIUM CHLORIDE 600; 310; 30; 20 MG/100ML; MG/100ML; MG/100ML; MG/100ML
75 INJECTION, SOLUTION INTRAVENOUS CONTINUOUS
Status: CANCELLED | OUTPATIENT
Start: 2024-01-31

## 2024-02-01 ENCOUNTER — ANESTHESIA (OUTPATIENT)
Dept: GASTROENTEROLOGY | Facility: AMBULATORY SURGERY CENTER | Age: 24
End: 2024-02-01

## 2024-02-01 ENCOUNTER — HOSPITAL ENCOUNTER (OUTPATIENT)
Dept: GASTROENTEROLOGY | Facility: AMBULATORY SURGERY CENTER | Age: 24
Discharge: HOME | End: 2024-02-01
Attending: INTERNAL MEDICINE
Payer: COMMERCIAL

## 2024-02-01 ENCOUNTER — ANESTHESIA EVENT (OUTPATIENT)
Dept: GASTROENTEROLOGY | Facility: AMBULATORY SURGERY CENTER | Age: 24
End: 2024-02-01

## 2024-02-01 VITALS
TEMPERATURE: 97.8 F | OXYGEN SATURATION: 95 % | HEIGHT: 70 IN | RESPIRATION RATE: 18 BRPM | WEIGHT: 140 LBS | DIASTOLIC BLOOD PRESSURE: 53 MMHG | HEART RATE: 69 BPM | BODY MASS INDEX: 20.04 KG/M2 | SYSTOLIC BLOOD PRESSURE: 105 MMHG

## 2024-02-01 DIAGNOSIS — R10.13 EPIGASTRIC PAIN: ICD-10-CM

## 2024-02-01 DIAGNOSIS — T36.4X5A ADVERSE EFFECT OF DOXYCYCLINE, INITIAL ENCOUNTER: ICD-10-CM

## 2024-02-01 DIAGNOSIS — K21.9 GASTROESOPHAGEAL REFLUX DISEASE, UNSPECIFIED WHETHER ESOPHAGITIS PRESENT: ICD-10-CM

## 2024-02-01 PROCEDURE — 88305 TISSUE EXAM BY PATHOLOGIST: CPT | Performed by: PATHOLOGY

## 2024-02-01 PROCEDURE — 43239 EGD BIOPSY SINGLE/MULTIPLE: CPT | Performed by: INTERNAL MEDICINE

## 2024-02-01 RX ORDER — LIDOCAINE HYDROCHLORIDE 20 MG/ML
INJECTION, SOLUTION EPIDURAL; INFILTRATION; INTRACAUDAL; PERINEURAL AS NEEDED
Status: DISCONTINUED | OUTPATIENT
Start: 2024-02-01 | End: 2024-02-01

## 2024-02-01 RX ORDER — SODIUM CHLORIDE, SODIUM LACTATE, POTASSIUM CHLORIDE, CALCIUM CHLORIDE 600; 310; 30; 20 MG/100ML; MG/100ML; MG/100ML; MG/100ML
75 INJECTION, SOLUTION INTRAVENOUS CONTINUOUS
Status: DISCONTINUED | OUTPATIENT
Start: 2024-02-01 | End: 2024-02-05 | Stop reason: HOSPADM

## 2024-02-01 RX ORDER — PROPOFOL 10 MG/ML
INJECTION, EMULSION INTRAVENOUS AS NEEDED
Status: DISCONTINUED | OUTPATIENT
Start: 2024-02-01 | End: 2024-02-01

## 2024-02-01 RX ADMIN — PROPOFOL 50 MG: 10 INJECTION, EMULSION INTRAVENOUS at 11:06

## 2024-02-01 RX ADMIN — PROPOFOL 150 MG: 10 INJECTION, EMULSION INTRAVENOUS at 11:02

## 2024-02-01 RX ADMIN — PROPOFOL 50 MG: 10 INJECTION, EMULSION INTRAVENOUS at 11:05

## 2024-02-01 RX ADMIN — PROPOFOL 50 MG: 10 INJECTION, EMULSION INTRAVENOUS at 11:04

## 2024-02-01 RX ADMIN — SODIUM CHLORIDE, SODIUM LACTATE, POTASSIUM CHLORIDE, CALCIUM CHLORIDE: 600; 310; 30; 20 INJECTION, SOLUTION INTRAVENOUS at 10:10

## 2024-02-01 RX ADMIN — PROPOFOL 50 MG: 10 INJECTION, EMULSION INTRAVENOUS at 11:03

## 2024-02-01 RX ADMIN — LIDOCAINE HYDROCHLORIDE 100 MG: 20 INJECTION, SOLUTION EPIDURAL; INFILTRATION; INTRACAUDAL; PERINEURAL at 11:00

## 2024-02-01 NOTE — INTERVAL H&P NOTE
H&P reviewed. After examining the patient I find no changes in the patients condition since the H&P had been written.    Vitals:    02/01/24 1026   BP: 147/71   Pulse: 67   Resp: 18   Temp: 97.8 °F (36.6 °C)   SpO2: 99%

## 2024-02-01 NOTE — ANESTHESIA PREPROCEDURE EVALUATION
Procedure:  EGD    Relevant Problems   GI/HEPATIC   (+) Gastroesophageal reflux disease      Other   (+) Gilbert syndrome        Physical Exam    Airway    Mallampati score: II  TM Distance: >3 FB  Neck ROM: full     Dental       Cardiovascular  Rhythm: regular, Rate: normal    Pulmonary   Breath sounds clear to auscultation    Other Findings        Anesthesia Plan  ASA Score- 2     Anesthesia Type- IV sedation with anesthesia with ASA Monitors.         Additional Monitors:     Airway Plan:            Plan Factors-    Chart reviewed.        Patient is not a current smoker.              Induction- intravenous.    Postoperative Plan-     Informed Consent- Anesthetic plan and risks discussed with patient.  I personally reviewed this patient with the CRNA. Discussed and agreed on the Anesthesia Plan with the CRNA..

## 2024-02-01 NOTE — ANESTHESIA POSTPROCEDURE EVALUATION
Post-Op Assessment Note    CV Status:  Stable  Pain Score: 0    Pain management: adequate       Mental Status:  Sleepy and arousable   Hydration Status:  Stable   PONV Controlled:  Controlled   Airway Patency:  Patent     Post Op Vitals Reviewed: Yes    No anethesia notable event occurred.    Staff: CRNA               BP   109/56   Temp      Pulse 77   Resp 14   SpO2 98

## 2024-02-05 PROCEDURE — 88305 TISSUE EXAM BY PATHOLOGIST: CPT | Performed by: PATHOLOGY

## 2024-03-20 ENCOUNTER — TELEPHONE (OUTPATIENT)
Dept: FAMILY MEDICINE CLINIC | Facility: CLINIC | Age: 24
End: 2024-03-20

## 2024-03-20 DIAGNOSIS — L70.0 ACNE VULGARIS: Primary | ICD-10-CM

## 2024-03-20 NOTE — TELEPHONE ENCOUNTER
Patient called and stated that he is out of doxycycline and would like to know if you can refill it for him?  Please call to advise

## 2024-03-22 RX ORDER — DOXYCYCLINE HYCLATE 20 MG
40 TABLET ORAL DAILY
Qty: 180 TABLET | Refills: 3 | Status: SHIPPED | OUTPATIENT
Start: 2024-03-22 | End: 2025-03-22

## 2024-03-22 NOTE — TELEPHONE ENCOUNTER
Patient was prescribed starting doxycycline 100 mg twice a day for 30 days and then I provided him with a written prescription of maintenance dose of doxycycline, 40 mg daily.  I will send this prescription to his pharmacy tonight.  Thank you

## 2024-11-18 ENCOUNTER — OFFICE VISIT (OUTPATIENT)
Dept: FAMILY MEDICINE CLINIC | Facility: CLINIC | Age: 24
End: 2024-11-18
Payer: COMMERCIAL

## 2024-11-18 VITALS
HEIGHT: 70 IN | OXYGEN SATURATION: 96 % | TEMPERATURE: 98.4 F | HEART RATE: 86 BPM | SYSTOLIC BLOOD PRESSURE: 120 MMHG | DIASTOLIC BLOOD PRESSURE: 70 MMHG | BODY MASS INDEX: 20.76 KG/M2 | RESPIRATION RATE: 16 BRPM | WEIGHT: 145 LBS

## 2024-11-18 DIAGNOSIS — R68.82 DECREASED LIBIDO: ICD-10-CM

## 2024-11-18 DIAGNOSIS — L65.9 HAIR THINNING: ICD-10-CM

## 2024-11-18 DIAGNOSIS — L21.9 SEBORRHEA: ICD-10-CM

## 2024-11-18 DIAGNOSIS — R53.83 FATIGUE, UNSPECIFIED TYPE: Primary | ICD-10-CM

## 2024-11-18 PROCEDURE — 99214 OFFICE O/P EST MOD 30 MIN: CPT | Performed by: NURSE PRACTITIONER

## 2024-11-18 RX ORDER — ISOTRETINOIN 30 MG/1
CAPSULE ORAL
COMMUNITY
Start: 2024-08-20 | End: 2024-11-18 | Stop reason: ALTCHOICE

## 2024-11-18 RX ORDER — KETOCONAZOLE 20 MG/ML
1 SHAMPOO, SUSPENSION TOPICAL 2 TIMES WEEKLY
Qty: 120 ML | Refills: 2 | Status: SHIPPED | OUTPATIENT
Start: 2024-11-18

## 2024-11-18 NOTE — PROGRESS NOTES
Name: Jean Hope      : 2000      MRN: [id number]  Encounter Provider: JANETH Doll  Encounter Date: 2024   Encounter department: [facility]  :  Assessment & Plan  Fatigue, unspecified type    Orders:    Comprehensive metabolic panel; Future    CBC; Future    Lipid panel; Future    TSH, 3rd generation; Future    Vitamin D 25 hydroxy; Future    Vitamin B12; Future    Testosterone, free, total; Future    Lyme Total AB W Reflex to IGM/IGG; Future    Iron Panel (Includes Ferritin, Iron Sat%, Iron, and TIBC); Future    Decreased libido    Orders:    Comprehensive metabolic panel; Future    CBC; Future    Lipid panel; Future    TSH, 3rd generation; Future    Vitamin D 25 hydroxy; Future    Vitamin B12; Future    Testosterone, free, total; Future    Hair thinning    Orders:    Comprehensive metabolic panel; Future    CBC; Future    Lipid panel; Future    TSH, 3rd generation; Future    Vitamin D 25 hydroxy; Future    Vitamin B12; Future    Testosterone, free, total; Future    Iron Panel (Includes Ferritin, Iron Sat%, Iron, and TIBC); Future    Seborrhea  Lather in shampoo, leave on scalp for 5 minutes and rinse do this 1-2 times per week.   May use regular shampoo as needed on other days of the week.   Call if not improving.   Orders:    ketoconazole (NIZORAL) 2 % shampoo; Apply 1 Application topically 2 (two) times a week      Blood work as noted.   Further plan of care pending results.        History of Present Illness     Jean Hope is a 24 year old male presenting today for concerns:  Fatigue  Hair thinning  Decreased libido    Started in .   Not getting worse, has not been getting better.     No colds, acute illnesses.     Sleeping 7-8 hours.   Still tired even when sleeping well.     No headaches.   No body aches or joint aches.     Somewhat high stress.   Graduated college this year, working full time now.   Doesn't really like his job.   Trying to adjust to work  "schedule.  Not eating well, only eating 800-1000 calories per day--due to schedule being so busy.     No known tick bites.   Hikes, but always checks after.     No rashes.     Exercises 3 days per week. Weights.     Regular diet, cut out dairy for acne.     Stable weight.     Denies nausea, vomiting, or diarrhea.     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     Scalp burning, itchy sensaton, thinning hair. Flaking sometimes.   Uses Selsun Blue 1-2 times per week.             Review of Systems   Constitutional:  Positive for fatigue. Negative for chills, diaphoresis and fever.   HENT: Negative.     Respiratory: Negative.     Cardiovascular: Negative.    Gastrointestinal: Negative.    Genitourinary: Negative.    Musculoskeletal:  Negative for arthralgias and myalgias.   Skin:  Negative for rash.   Neurological:  Negative for headaches.     Current Outpatient Medications on File Prior to Visit   Medication Sig Dispense Refill    [DISCONTINUED] doxycycline (PERIOSTAT) 20 MG tablet Take 2 tablets (40 mg total) by mouth daily (Patient not taking: Reported on 11/18/2024) 180 tablet 3    [DISCONTINUED] ISOtretinoin (Claravis) 30 MG capsule Take by mouth (Patient not taking: Reported on 11/18/2024)      [DISCONTINUED] omeprazole (PriLOSEC) 40 MG capsule Take 1 capsule (40 mg total) by mouth daily (Patient not taking: Reported on 11/18/2024) 90 capsule 3     No current facility-administered medications on file prior to visit.         Objective   /70   Pulse 86   Temp 98.4 °F (36.9 °C) (Temporal)   Resp 16   Ht 5' 10\" (1.778 m)   Wt 65.8 kg (145 lb)   SpO2 96%   BMI 20.81 kg/m²      Physical Exam  Vitals and nursing note reviewed.   Constitutional:       General: He is not in acute distress.     Appearance: Normal appearance. He is not ill-appearing.   HENT:      Head: Atraumatic.      Right Ear: Tympanic membrane normal.      Left Ear: Tympanic membrane normal.      Mouth/Throat:      Mouth: Mucous " membranes are moist.      Pharynx: Oropharynx is clear.   Eyes:      Conjunctiva/sclera: Conjunctivae normal.      Pupils: Pupils are equal, round, and reactive to light.   Cardiovascular:      Rate and Rhythm: Normal rate and regular rhythm.      Heart sounds: No murmur heard.  Pulmonary:      Effort: Pulmonary effort is normal. No respiratory distress.      Breath sounds: Normal breath sounds. No wheezing or rales.   Abdominal:      General: Bowel sounds are normal.      Palpations: Abdomen is soft.      Tenderness: There is no abdominal tenderness.   Musculoskeletal:         General: No deformity.      Cervical back: Normal range of motion and neck supple.      Right lower leg: No edema.      Left lower leg: No edema.   Lymphadenopathy:      Cervical: No cervical adenopathy.   Skin:     Findings: No rash.      Comments: Scalp with no rash, but there is flaking in hair.   No alopecia. No obvious thinning on exam.    Neurological:      Mental Status: He is alert.   Psychiatric:         Mood and Affect: Mood normal.

## 2024-11-20 ENCOUNTER — APPOINTMENT (OUTPATIENT)
Dept: LAB | Facility: CLINIC | Age: 24
End: 2024-11-20
Payer: COMMERCIAL

## 2024-11-20 DIAGNOSIS — L65.9 HAIR THINNING: ICD-10-CM

## 2024-11-20 DIAGNOSIS — R53.83 FATIGUE, UNSPECIFIED TYPE: ICD-10-CM

## 2024-11-20 DIAGNOSIS — R68.82 DECREASED LIBIDO: ICD-10-CM

## 2024-11-20 LAB
25(OH)D3 SERPL-MCNC: 22.2 NG/ML (ref 30–100)
ALBUMIN SERPL BCG-MCNC: 5.1 G/DL (ref 3.5–5)
ALP SERPL-CCNC: 69 U/L (ref 34–104)
ALT SERPL W P-5'-P-CCNC: 13 U/L (ref 7–52)
ANION GAP SERPL CALCULATED.3IONS-SCNC: 10 MMOL/L (ref 4–13)
AST SERPL W P-5'-P-CCNC: 16 U/L (ref 13–39)
B BURGDOR IGG+IGM SER QL IA: NEGATIVE
BILIRUB SERPL-MCNC: 1.7 MG/DL (ref 0.2–1)
BUN SERPL-MCNC: 12 MG/DL (ref 5–25)
CALCIUM SERPL-MCNC: 9.8 MG/DL (ref 8.4–10.2)
CHLORIDE SERPL-SCNC: 101 MMOL/L (ref 96–108)
CHOLEST SERPL-MCNC: 194 MG/DL (ref ?–200)
CO2 SERPL-SCNC: 31 MMOL/L (ref 21–32)
CREAT SERPL-MCNC: 0.83 MG/DL (ref 0.6–1.3)
ERYTHROCYTE [DISTWIDTH] IN BLOOD BY AUTOMATED COUNT: 12.1 % (ref 11.6–15.1)
FERRITIN SERPL-MCNC: 151 NG/ML (ref 24–336)
GFR SERPL CREATININE-BSD FRML MDRD: 124 ML/MIN/1.73SQ M
GLUCOSE P FAST SERPL-MCNC: 91 MG/DL (ref 65–99)
HCT VFR BLD AUTO: 48.6 % (ref 36.5–49.3)
HDLC SERPL-MCNC: 47 MG/DL
HGB BLD-MCNC: 16.5 G/DL (ref 12–17)
IRON SATN MFR SERPL: 40 % (ref 15–50)
IRON SERPL-MCNC: 137 UG/DL (ref 50–212)
LDLC SERPL CALC-MCNC: 134 MG/DL (ref 0–100)
MCH RBC QN AUTO: 30.8 PG (ref 26.8–34.3)
MCHC RBC AUTO-ENTMCNC: 34 G/DL (ref 31.4–37.4)
MCV RBC AUTO: 91 FL (ref 82–98)
NONHDLC SERPL-MCNC: 147 MG/DL
PLATELET # BLD AUTO: 194 THOUSANDS/UL (ref 149–390)
PMV BLD AUTO: 10.1 FL (ref 8.9–12.7)
POTASSIUM SERPL-SCNC: 4.1 MMOL/L (ref 3.5–5.3)
PROT SERPL-MCNC: 8 G/DL (ref 6.4–8.4)
RBC # BLD AUTO: 5.36 MILLION/UL (ref 3.88–5.62)
SODIUM SERPL-SCNC: 142 MMOL/L (ref 135–147)
TIBC SERPL-MCNC: 339 UG/DL (ref 250–450)
TRIGL SERPL-MCNC: 64 MG/DL (ref ?–150)
TSH SERPL DL<=0.05 MIU/L-ACNC: 1.21 UIU/ML (ref 0.45–4.5)
UIBC SERPL-MCNC: 202 UG/DL (ref 155–355)
VIT B12 SERPL-MCNC: 468 PG/ML (ref 180–914)
WBC # BLD AUTO: 3.84 THOUSAND/UL (ref 4.31–10.16)

## 2024-11-20 PROCEDURE — 83540 ASSAY OF IRON: CPT

## 2024-11-20 PROCEDURE — 82607 VITAMIN B-12: CPT

## 2024-11-20 PROCEDURE — 84402 ASSAY OF FREE TESTOSTERONE: CPT

## 2024-11-20 PROCEDURE — 80053 COMPREHEN METABOLIC PANEL: CPT

## 2024-11-20 PROCEDURE — 83550 IRON BINDING TEST: CPT

## 2024-11-20 PROCEDURE — 84443 ASSAY THYROID STIM HORMONE: CPT

## 2024-11-20 PROCEDURE — 80061 LIPID PANEL: CPT

## 2024-11-20 PROCEDURE — 86618 LYME DISEASE ANTIBODY: CPT

## 2024-11-20 PROCEDURE — 82728 ASSAY OF FERRITIN: CPT

## 2024-11-20 PROCEDURE — 85027 COMPLETE CBC AUTOMATED: CPT

## 2024-11-20 PROCEDURE — 82306 VITAMIN D 25 HYDROXY: CPT

## 2024-11-20 PROCEDURE — 84403 ASSAY OF TOTAL TESTOSTERONE: CPT

## 2024-11-20 PROCEDURE — 36415 COLL VENOUS BLD VENIPUNCTURE: CPT

## 2024-11-21 ENCOUNTER — RESULTS FOLLOW-UP (OUTPATIENT)
Dept: FAMILY MEDICINE CLINIC | Facility: CLINIC | Age: 24
End: 2024-11-21

## 2024-11-21 DIAGNOSIS — E55.9 HYPOVITAMINOSIS D: ICD-10-CM

## 2024-11-21 DIAGNOSIS — D72.819 LEUKOPENIA, UNSPECIFIED TYPE: Primary | ICD-10-CM

## 2024-11-21 LAB
TESTOST FREE SERPL-MCNC: 19.4 PG/ML (ref 9.3–26.5)
TESTOST SERPL-MCNC: 525 NG/DL (ref 264–916)

## 2024-11-21 NOTE — TELEPHONE ENCOUNTER
----- Message from JANETH Castillo sent at 11/21/2024  9:07 AM EST -----  Please contact patient with results of blood work.   Vitamin D level is mildly low, take a daily vitamin D supplement 2000 units daily.   White blood cell count is mildly low--please repeat this in one month to make sure it returns to normal.   Testosterone is still pending.   All other blood work is good.

## 2025-02-17 ENCOUNTER — APPOINTMENT (OUTPATIENT)
Dept: LAB | Facility: CLINIC | Age: 25
End: 2025-02-17
Payer: COMMERCIAL

## 2025-02-17 DIAGNOSIS — D72.819 LEUKOPENIA, UNSPECIFIED TYPE: ICD-10-CM

## 2025-02-17 LAB
BASOPHILS # BLD AUTO: 0.02 THOUSANDS/ΜL (ref 0–0.1)
BASOPHILS NFR BLD AUTO: 0 % (ref 0–1)
EOSINOPHIL # BLD AUTO: 0.08 THOUSAND/ΜL (ref 0–0.61)
EOSINOPHIL NFR BLD AUTO: 2 % (ref 0–6)
ERYTHROCYTE [DISTWIDTH] IN BLOOD BY AUTOMATED COUNT: 11.9 % (ref 11.6–15.1)
HCT VFR BLD AUTO: 49.1 % (ref 36.5–49.3)
HGB BLD-MCNC: 16.9 G/DL (ref 12–17)
IMM GRANULOCYTES # BLD AUTO: 0.01 THOUSAND/UL (ref 0–0.2)
IMM GRANULOCYTES NFR BLD AUTO: 0 % (ref 0–2)
LYMPHOCYTES # BLD AUTO: 1.58 THOUSANDS/ΜL (ref 0.6–4.47)
LYMPHOCYTES NFR BLD AUTO: 33 % (ref 14–44)
MCH RBC QN AUTO: 31.5 PG (ref 26.8–34.3)
MCHC RBC AUTO-ENTMCNC: 34.4 G/DL (ref 31.4–37.4)
MCV RBC AUTO: 91 FL (ref 82–98)
MONOCYTES # BLD AUTO: 0.39 THOUSAND/ΜL (ref 0.17–1.22)
MONOCYTES NFR BLD AUTO: 8 % (ref 4–12)
NEUTROPHILS # BLD AUTO: 2.69 THOUSANDS/ΜL (ref 1.85–7.62)
NEUTS SEG NFR BLD AUTO: 57 % (ref 43–75)
NRBC BLD AUTO-RTO: 0 /100 WBCS
PLATELET # BLD AUTO: 208 THOUSANDS/UL (ref 149–390)
PMV BLD AUTO: 9.6 FL (ref 8.9–12.7)
RBC # BLD AUTO: 5.37 MILLION/UL (ref 3.88–5.62)
WBC # BLD AUTO: 4.77 THOUSAND/UL (ref 4.31–10.16)

## 2025-02-17 PROCEDURE — 36415 COLL VENOUS BLD VENIPUNCTURE: CPT

## 2025-02-17 PROCEDURE — 85025 COMPLETE CBC W/AUTO DIFF WBC: CPT

## 2025-02-18 ENCOUNTER — RESULTS FOLLOW-UP (OUTPATIENT)
Dept: FAMILY MEDICINE CLINIC | Facility: CLINIC | Age: 25
End: 2025-02-18

## 2025-02-18 NOTE — TELEPHONE ENCOUNTER
----- Message from JANETH Castillo sent at 2/18/2025  7:24 AM EST -----  White blood count is back to normal.

## 2025-08-13 ENCOUNTER — OFFICE VISIT (OUTPATIENT)
Dept: FAMILY MEDICINE CLINIC | Facility: CLINIC | Age: 25
End: 2025-08-13
Payer: COMMERCIAL

## 2025-08-19 ENCOUNTER — OFFICE VISIT (OUTPATIENT)
Dept: FAMILY MEDICINE CLINIC | Facility: CLINIC | Age: 25
End: 2025-08-19
Payer: COMMERCIAL

## 2025-08-19 VITALS
WEIGHT: 148.4 LBS | HEIGHT: 70 IN | DIASTOLIC BLOOD PRESSURE: 72 MMHG | OXYGEN SATURATION: 97 % | TEMPERATURE: 98.4 F | HEART RATE: 62 BPM | BODY MASS INDEX: 21.24 KG/M2 | RESPIRATION RATE: 16 BRPM | SYSTOLIC BLOOD PRESSURE: 118 MMHG

## 2025-08-19 DIAGNOSIS — L64.9 ANDROGENIC ALOPECIA: Primary | ICD-10-CM

## 2025-08-19 DIAGNOSIS — L70.0 ACNE VULGARIS: ICD-10-CM

## 2025-08-19 PROCEDURE — 99213 OFFICE O/P EST LOW 20 MIN: CPT | Performed by: FAMILY MEDICINE

## 2025-08-19 RX ORDER — FINASTERIDE 1 MG/1
1 TABLET, FILM COATED ORAL DAILY
Qty: 90 TABLET | Refills: 0 | Status: SHIPPED | OUTPATIENT
Start: 2025-08-19

## 2025-08-19 RX ORDER — TRETINOIN 0.25 MG/G
CREAM TOPICAL
Qty: 45 G | Refills: 5 | Status: SHIPPED | OUTPATIENT
Start: 2025-08-19

## 2025-08-22 PROBLEM — L64.9 ANDROGENIC ALOPECIA: Status: ACTIVE | Noted: 2025-08-22
